# Patient Record
Sex: MALE | Race: WHITE | NOT HISPANIC OR LATINO
[De-identification: names, ages, dates, MRNs, and addresses within clinical notes are randomized per-mention and may not be internally consistent; named-entity substitution may affect disease eponyms.]

---

## 2017-01-05 ENCOUNTER — APPOINTMENT (OUTPATIENT)
Dept: INTERNAL MEDICINE | Facility: CLINIC | Age: 52
End: 2017-01-05

## 2017-01-05 VITALS — WEIGHT: 182 LBS | HEIGHT: 69 IN | BODY MASS INDEX: 26.96 KG/M2

## 2017-01-05 VITALS
DIASTOLIC BLOOD PRESSURE: 68 MMHG | TEMPERATURE: 99.7 F | SYSTOLIC BLOOD PRESSURE: 122 MMHG | RESPIRATION RATE: 14 BRPM | HEART RATE: 66 BPM

## 2017-01-05 DIAGNOSIS — J32.9 CHRONIC SINUSITIS, UNSPECIFIED: ICD-10-CM

## 2017-04-26 ENCOUNTER — RX RENEWAL (OUTPATIENT)
Age: 52
End: 2017-04-26

## 2018-01-22 ENCOUNTER — OUTPATIENT (OUTPATIENT)
Dept: OUTPATIENT SERVICES | Facility: HOSPITAL | Age: 53
LOS: 1 days | End: 2018-01-22
Payer: COMMERCIAL

## 2018-01-22 ENCOUNTER — APPOINTMENT (OUTPATIENT)
Dept: ULTRASOUND IMAGING | Facility: HOSPITAL | Age: 53
End: 2018-01-22
Payer: COMMERCIAL

## 2018-01-22 DIAGNOSIS — N20.0 CALCULUS OF KIDNEY: ICD-10-CM

## 2018-01-22 DIAGNOSIS — Z00.8 ENCOUNTER FOR OTHER GENERAL EXAMINATION: ICD-10-CM

## 2018-01-22 PROCEDURE — 76775 US EXAM ABDO BACK WALL LIM: CPT | Mod: 26

## 2018-01-22 PROCEDURE — 76775 US EXAM ABDO BACK WALL LIM: CPT

## 2018-06-14 ENCOUNTER — RX RENEWAL (OUTPATIENT)
Age: 53
End: 2018-06-14

## 2018-08-13 ENCOUNTER — APPOINTMENT (OUTPATIENT)
Dept: INTERNAL MEDICINE | Facility: CLINIC | Age: 53
End: 2018-08-13
Payer: COMMERCIAL

## 2018-08-13 ENCOUNTER — NON-APPOINTMENT (OUTPATIENT)
Age: 53
End: 2018-08-13

## 2018-08-13 VITALS
HEART RATE: 56 BPM | SYSTOLIC BLOOD PRESSURE: 128 MMHG | HEIGHT: 69 IN | WEIGHT: 176 LBS | RESPIRATION RATE: 14 BRPM | DIASTOLIC BLOOD PRESSURE: 74 MMHG | BODY MASS INDEX: 26.07 KG/M2

## 2018-08-13 PROCEDURE — 93000 ELECTROCARDIOGRAM COMPLETE: CPT

## 2018-08-13 PROCEDURE — 99245 OFF/OP CONSLTJ NEW/EST HI 55: CPT | Mod: 25

## 2018-08-13 RX ORDER — FLUTICASONE PROPIONATE 50 UG/1
50 SPRAY, METERED NASAL
Qty: 16 | Refills: 0 | Status: DISCONTINUED | COMMUNITY
Start: 2017-01-02 | End: 2018-08-13

## 2018-08-13 RX ORDER — LEVOFLOXACIN 500 MG/1
500 TABLET, FILM COATED ORAL DAILY
Qty: 10 | Refills: 0 | Status: DISCONTINUED | COMMUNITY
Start: 2017-01-05 | End: 2018-08-13

## 2018-08-13 RX ORDER — BRINZOLAMIDE/BRIMONIDINE TARTRATE 10; 2 MG/ML; MG/ML
1-0.2 SUSPENSION/ DROPS OPHTHALMIC
Qty: 8 | Refills: 0 | Status: DISCONTINUED | COMMUNITY
Start: 2016-07-13 | End: 2018-08-13

## 2018-08-13 NOTE — PHYSICAL EXAM
[No Acute Distress] : no acute distress [Well Nourished] : well nourished [Well Developed] : well developed [Well-Appearing] : well-appearing [Normal Sclera/Conjunctiva] : normal sclera/conjunctiva [PERRL] : pupils equal round and reactive to light [EOMI] : extraocular movements intact [Normal Outer Ear/Nose] : the outer ears and nose were normal in appearance [Normal Oropharynx] : the oropharynx was normal [Normal TMs] : both tympanic membranes were normal [Normal Nasal Mucosa] : the nasal mucosa was normal [No JVD] : no jugular venous distention [Supple] : supple [No Lymphadenopathy] : no lymphadenopathy [Thyroid Normal, No Nodules] : the thyroid was normal and there were no nodules present [No Respiratory Distress] : no respiratory distress  [Clear to Auscultation] : lungs were clear to auscultation bilaterally [No Accessory Muscle Use] : no accessory muscle use [Normal Rate] : normal rate  [Regular Rhythm] : with a regular rhythm [Normal S1, S2] : normal S1 and S2 [No Murmur] : no murmur heard [No Carotid Bruits] : no carotid bruits [No Abdominal Bruit] : a ~M bruit was not heard ~T in the abdomen [No Varicosities] : no varicosities [Pedal Pulses Present] : the pedal pulses are present [No Edema] : there was no peripheral edema [No Extremity Clubbing/Cyanosis] : no extremity clubbing/cyanosis [No Palpable Aorta] : no palpable aorta [Declined Breast Exam] : declined breast exam  [Soft] : abdomen soft [Non Tender] : non-tender [Non-distended] : non-distended [No Masses] : no abdominal mass palpated [No HSM] : no HSM [Normal Bowel Sounds] : normal bowel sounds [No Hernias] : no hernias [Declined Rectal Exam] : declined rectal exam [Normal Supraclavicular Nodes] : no supraclavicular lymphadenopathy [Normal Axillary Nodes] : no axillary lymphadenopathy [Normal Posterior Cervical Nodes] : no posterior cervical lymphadenopathy [Normal Femoral Nodes] : no femoral lymphadenopathy [Normal Anterior Cervical Nodes] : no anterior cervical lymphadenopathy [Normal Inguinal Nodes] : no inguinal lymphadenopathy [No CVA Tenderness] : no CVA  tenderness [No Spinal Tenderness] : no spinal tenderness [No Joint Swelling] : no joint swelling [Grossly Normal Strength/Tone] : grossly normal strength/tone [No Rash] : no rash [No Skin Lesions] : no skin lesions [Normal Gait] : normal gait [Coordination Grossly Intact] : coordination grossly intact [No Focal Deficits] : no focal deficits [Deep Tendon Reflexes (DTR)] : deep tendon reflexes were 2+ and symmetric [Normal Affect] : the affect was normal [Normal Insight/Judgement] : insight and judgment were intact [Kyphosis] : no kyphosis [Scoliosis] : no scoliosis

## 2018-08-13 NOTE — ASSESSMENT
[Patient Optimized for Surgery] : Patient optimized for surgery [No Further Testing Recommended] : no further testing recommended [FreeTextEntry4] : Examination shows a well-developed man in no acute distress blood pressure was 120/74 pulse was 56 and regular respiratory rate was 14 HEENT was unremarkable chest was clear cardiovascular was regular with no extra heart sounds or murmurs abdomen was soft and nontender extremities showed no clubbing cyanosis or edema neurologic exam was nonfocal preoperative EKG showed sinus bradycardia otherwise normal with no acute ST-T wave changes preadmission CBC was normal patient's basic metabolic profile showed normal electrolytes and a creatinine of 1.35 ....patient is cleared medically for planned back surgery

## 2018-08-13 NOTE — RESULTS/DATA
[] : results reviewed [de-identified] : nl [de-identified] : cr 1.35 [de-identified] : sb otherwise normal
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
## 2018-08-14 LAB
ANION GAP SERPL CALC-SCNC: 16 MMOL/L
BASOPHILS # BLD AUTO: 0.05 K/UL
BASOPHILS NFR BLD AUTO: 0.7 %
BUN SERPL-MCNC: 18 MG/DL
CALCIUM SERPL-MCNC: 9.4 MG/DL
CHLORIDE SERPL-SCNC: 104 MMOL/L
CO2 SERPL-SCNC: 21 MMOL/L
CREAT SERPL-MCNC: 1.35 MG/DL
EOSINOPHIL # BLD AUTO: 0.13 K/UL
EOSINOPHIL NFR BLD AUTO: 1.7 %
GLUCOSE SERPL-MCNC: 83 MG/DL
HCT VFR BLD CALC: 47.9 %
HGB BLD-MCNC: 16.5 G/DL
IMM GRANULOCYTES NFR BLD AUTO: 0.1 %
LYMPHOCYTES # BLD AUTO: 2.65 K/UL
LYMPHOCYTES NFR BLD AUTO: 35.6 %
MAN DIFF?: NORMAL
MCHC RBC-ENTMCNC: 32.2 PG
MCHC RBC-ENTMCNC: 34.4 GM/DL
MCV RBC AUTO: 93.4 FL
MONOCYTES # BLD AUTO: 0.6 K/UL
MONOCYTES NFR BLD AUTO: 8.1 %
NEUTROPHILS # BLD AUTO: 4.01 K/UL
NEUTROPHILS NFR BLD AUTO: 53.8 %
PLATELET # BLD AUTO: 245 K/UL
POTASSIUM SERPL-SCNC: 4.6 MMOL/L
RBC # BLD: 5.13 M/UL
RBC # FLD: 12.8 %
SODIUM SERPL-SCNC: 141 MMOL/L
WBC # FLD AUTO: 7.45 K/UL

## 2018-11-16 ENCOUNTER — APPOINTMENT (OUTPATIENT)
Dept: INTERNAL MEDICINE | Facility: CLINIC | Age: 53
End: 2018-11-16
Payer: COMMERCIAL

## 2018-11-16 VITALS
WEIGHT: 178 LBS | DIASTOLIC BLOOD PRESSURE: 76 MMHG | HEART RATE: 60 BPM | RESPIRATION RATE: 15 BRPM | HEIGHT: 69 IN | BODY MASS INDEX: 26.36 KG/M2 | SYSTOLIC BLOOD PRESSURE: 126 MMHG

## 2018-11-16 DIAGNOSIS — Z23 ENCOUNTER FOR IMMUNIZATION: ICD-10-CM

## 2018-11-16 PROCEDURE — 99215 OFFICE O/P EST HI 40 MIN: CPT | Mod: 25

## 2018-11-16 PROCEDURE — 90686 IIV4 VACC NO PRSV 0.5 ML IM: CPT

## 2018-11-16 PROCEDURE — G0008: CPT

## 2018-11-16 NOTE — HISTORY OF PRESENT ILLNESS
[FreeTextEntry1] : Comes to the office for followup view medication she can discuss his overall health he is recovering from her recent back operation [de-identified] : 53-year-old man history of back surgery allergies migraines comes to the office for followup and to receive the influenza vaccine is also needs a slip for complete blood tests his back has been doing better he continues with physical therapy he denies temperature chills sweats or myalgias he's had no headache sinus congestion sore throat cough wheezing pleurisy chest pain shortness of breath exertional dyspnea lightheadedness palpitations dizziness vertigo or syncope denies abdominal pain nausea vomiting diarrhea or constipation bright red blood per rectum or black stools his appetite has been good his weight is stable he works on his feet and does not do any formal exercise he said no dysuria said minimal nocturia no problems with erections\par \par

## 2018-11-16 NOTE — REVIEW OF SYSTEMS
[Nocturia] : nocturia [Back Pain] : back pain [Negative] : Heme/Lymph [Fever] : no fever [Chills] : no chills [Fatigue] : no fatigue [Hot Flashes] : no hot flashes [Night Sweats] : no night sweats [Recent Change In Weight] : ~T no recent weight change [Discharge] : no discharge [Pain] : no pain [Redness] : no redness [Dryness] : no dryness [Vision Problems] : no vision problems [Itching] : no itching [Earache] : no earache [Hearing Loss] : no hearing loss [Nosebleeds] : no nosebleeds [Postnasal Drip] : no postnasal drip [Nasal Discharge] : no nasal discharge [Sore Throat] : no sore throat [Hoarseness] : no hoarseness [Chest Pain] : no chest pain [Palpitations] : no palpitations [Claudication] : no  leg claudication [Lower Ext Edema] : no lower extremity edema [Orthopena] : no orthopnea [Paroysmal Nocturnal Dyspnea] : no paroysmal nocturnal dyspnea [Shortness Of Breath] : no shortness of breath [Wheezing] : no wheezing [Cough] : no cough [Dyspnea on Exertion] : not dyspnea on exertion [Abdominal Pain] : no abdominal pain [Nausea] : no nausea [Constipation] : no constipation [Diarrhea] : no diarrhea [Vomiting] : no vomiting [Heartburn] : no heartburn [Melena] : no melena [Dysuria] : no dysuria [Incontinence] : no incontinence [Hesitancy] : no hesitancy [Hematuria] : no hematuria [Frequency] : no frequency [Impotence] : no impotency [Poor Libido] : libido not poor [Joint Pain] : no joint pain [Joint Stiffness] : no joint stiffness [Muscle Pain] : no muscle pain [Muscle Weakness] : no muscle weakness [Joint Swelling] : no joint swelling [Itching] : no itching [Mole Changes] : no mole changes [Nail Changes] : no nail changes [Hair Changes] : no hair changes [Skin Rash] : no skin rash [Headache] : no headache [Dizziness] : no dizziness [Fainting] : no fainting [Confusion] : no confusion [Unsteady Walk] : no ataxia [Memory Loss] : no memory loss [Suicidal] : not suicidal [Insomnia] : no insomnia [Anxiety] : no anxiety [Depression] : no depression [Easy Bleeding] : no easy bleeding [Easy Bruising] : no easy bruising [Swollen Glands] : no swollen glands

## 2018-11-16 NOTE — HEALTH RISK ASSESSMENT
[No falls in past year] : Patient reported no falls in the past year [0] : 2) Feeling down, depressed, or hopeless: Not at all (0) [[id number]] : 0

## 2018-11-16 NOTE — ASSESSMENT
[FreeTextEntry1] : Physical examination reveals a well-developed man in no acute distress blood pressure was 126/76 height 5 foot 9 weight 178 pounds pulse is 60 respirations 15 HEENT was unremarkable chest was clear cardiovascular exam was regular abdomen was soft and nontender extremities showed no clubbing cyanosis or edema neurologic exam was nonfocal a slip was given for complete blood testing including hepatitis C antibody PSA CBC full chemistries lipid profile hemoglobin A1c thyroid profile and vitamins D3 and B12 he is up-to-date with his ophthalmologist he was given the name of a dermatologist to go to his colonoscopy was performed 2 years ago we will try to exercise more to get some cardiovascular exercise

## 2019-12-11 ENCOUNTER — EMERGENCY (EMERGENCY)
Facility: HOSPITAL | Age: 54
LOS: 1 days | Discharge: HOME | End: 2019-12-11
Attending: EMERGENCY MEDICINE | Admitting: EMERGENCY MEDICINE
Payer: COMMERCIAL

## 2019-12-11 VITALS
HEART RATE: 76 BPM | SYSTOLIC BLOOD PRESSURE: 117 MMHG | TEMPERATURE: 98 F | OXYGEN SATURATION: 98 % | WEIGHT: 179.9 LBS | HEIGHT: 69 IN | DIASTOLIC BLOOD PRESSURE: 103 MMHG | RESPIRATION RATE: 18 BRPM

## 2019-12-11 VITALS
SYSTOLIC BLOOD PRESSURE: 122 MMHG | DIASTOLIC BLOOD PRESSURE: 98 MMHG | OXYGEN SATURATION: 99 % | RESPIRATION RATE: 18 BRPM | HEART RATE: 72 BPM

## 2019-12-11 DIAGNOSIS — Z98.890 OTHER SPECIFIED POSTPROCEDURAL STATES: Chronic | ICD-10-CM

## 2019-12-11 DIAGNOSIS — M54.2 CERVICALGIA: ICD-10-CM

## 2019-12-11 PROCEDURE — 96372 THER/PROPH/DIAG INJ SC/IM: CPT

## 2019-12-11 PROCEDURE — 99284 EMERGENCY DEPT VISIT MOD MDM: CPT | Mod: 25

## 2019-12-11 PROCEDURE — 99283 EMERGENCY DEPT VISIT LOW MDM: CPT

## 2019-12-11 RX ORDER — IBUPROFEN 200 MG
1 TABLET ORAL
Qty: 15 | Refills: 0
Start: 2019-12-11

## 2019-12-11 RX ORDER — LIDOCAINE 4 G/100G
1 CREAM TOPICAL ONCE
Refills: 0 | Status: COMPLETED | OUTPATIENT
Start: 2019-12-11 | End: 2019-12-11

## 2019-12-11 RX ORDER — DIAZEPAM 5 MG
1 TABLET ORAL
Qty: 10 | Refills: 0
Start: 2019-12-11

## 2019-12-11 RX ORDER — DIAZEPAM 5 MG
5 TABLET ORAL ONCE
Refills: 0 | Status: DISCONTINUED | OUTPATIENT
Start: 2019-12-11 | End: 2019-12-11

## 2019-12-11 RX ORDER — KETOROLAC TROMETHAMINE 30 MG/ML
30 SYRINGE (ML) INJECTION ONCE
Refills: 0 | Status: DISCONTINUED | OUTPATIENT
Start: 2019-12-11 | End: 2019-12-11

## 2019-12-11 RX ORDER — ATORVASTATIN CALCIUM 80 MG/1
1 TABLET, FILM COATED ORAL
Qty: 0 | Refills: 0 | DISCHARGE

## 2019-12-11 RX ADMIN — Medication 5 MILLIGRAM(S): at 12:45

## 2019-12-11 RX ADMIN — LIDOCAINE 1 PATCH: 4 CREAM TOPICAL at 12:45

## 2019-12-11 RX ADMIN — Medication 30 MILLIGRAM(S): at 12:45

## 2019-12-11 NOTE — ED ADULT NURSE NOTE - CHIEF COMPLAINT
Received appeal response and placed in your in basket. Appeal was denied as it is a plan exclusion. Any product dispensed for the purpose of appetite suppression or weight loss. The patient is a 54y Male complaining of neck pain.

## 2019-12-11 NOTE — ED PROVIDER NOTE - NSFOLLOWUPINSTRUCTIONS_ED_ALL_ED_FT
1. Lidocaine patches for relief  2. Motrin 400mg every 6 hours on a full stomach as needed for pain  3. Valium 5mg every 8 hours as needed for pain. Do not drink alcohol or drive on this med  4. Follow up with Dr. Allen in 1-2 days  5. Return to the ED for worsening pain, inability to turn your neck or any concerns  ******    Spasmodic Torticollis    WHAT YOU NEED TO KNOW:    Spasmodic torticollis, also called cervical dystonia, is a condition that causes your neck muscles to contract abnormally. Your neck may twist and cause your head to tilt to one side, forward, or backward. Spasmodic torticollis may occur occasionally or continuously. It often gets worse with stress.    DISCHARGE INSTRUCTIONS:    Medicines: You may need any of the following:     Muscle relaxers decrease pain and muscle spasms.      Botulinum toxin injections may also be given to relax your muscles.      NSAIDs, such as ibuprofen, help decrease swelling, pain, and fever. This medicine is available with or without a doctor's order. NSAIDs can cause stomach bleeding or kidney problems in certain people. If you take blood thinner medicine, always ask if NSAIDs are safe for you. Always read the medicine label and follow directions. Do not give these medicines to children under 6 months of age without direction from your child's healthcare provider.      Acetaminophen decreases pain. It is available without a doctor's order. Ask how much to take and how often to take it. Follow directions. Acetaminophen can cause liver damage if not taken correctly.      Prescription pain medicine may be given. Ask your healthcare provider how to take this medicine safely.      Take your medicine as directed. Contact your healthcare provider if you think your medicine is not helping or if you have side effects. Tell him if you are allergic to any medicine. Keep a list of the medicines, vitamins, and herbs you take. Include the amounts, and when and why you take them. Bring the list or the pill bottles to follow-up visits. Carry your medicine list with you in case of an emergency.    Follow up with your healthcare provider as directed: Write down your questions so you remember to ask them during your visits.    Self-care:     Apply ice on your neck for 15 to 20 minutes every hour or as directed. Use an ice pack, or put crushed ice in a plastic bag. Cover it with a towel. Ice helps prevent tissue damage and decreases swelling and pain.      Apply heat on your neck for 20 to 30 minutes every 2 hours for as many days as directed. Heat helps decrease pain and muscle spasms.      Rest as needed. Return to your daily activities as directed.       Use cervical collar as directed. A cervical collar may be needed to support your neck.     Contact your healthcare provider if:     You have a fever.       You have swelling in your neck area that gets worse or does not go away.      You have an increased feeling of sadness or loneliness, or you feel depressed.      You have questions or concerns about your condition or care.    Return to the emergency department if:     You have increased pain in your neck or shoulder.      You have sudden shortness of breath.       You have trouble moving your arms or legs.      Your arms or legs feel numb.

## 2019-12-11 NOTE — ED ADULT TRIAGE NOTE - CHIEF COMPLAINT QUOTE
Pt BIB EMS ambulatory with c/o left upper neck spasm starting this AM. Pt took Cyclobenzaprine at 10 am with minimal relief. Pt denies any recent injury or trauma. Pt has hx of back surgery one year ago.

## 2019-12-11 NOTE — ED PROVIDER NOTE - PATIENT PORTAL LINK FT
You can access the FollowMyHealth Patient Portal offered by [facility] by registering at the following website: [email or web address]. By joining [facility]’s FollowMyHealth portal, you will also be able to view your health information using other applications (apps) compatible with our system.

## 2019-12-11 NOTE — ED PROVIDER NOTE - OBJECTIVE STATEMENT
54M h/o lower back surgery woke up with left neck pain worse with ROM. No weakness or numbness in arms or legs. No chest pain or sob. No blurry vision. took Flexeril with minimal relief.

## 2019-12-11 NOTE — ED ADULT NURSE NOTE - OBJECTIVE STATEMENT
Pt arrives to ER c/o left sided neck pain since this morning when he woke up. pt reports spasms, denies numbness or tingling, denies injury.

## 2019-12-11 NOTE — ED PROVIDER NOTE - CLINICAL SUMMARY MEDICAL DECISION MAKING FREE TEXT BOX
Pt p/w left neck pain on waking, no neuro sx, likely torticollis, pt improved with symptomatic treatment.

## 2019-12-17 ENCOUNTER — MEDICATION RENEWAL (OUTPATIENT)
Age: 54
End: 2019-12-17

## 2019-12-17 ENCOUNTER — RX RENEWAL (OUTPATIENT)
Age: 54
End: 2019-12-17

## 2020-01-09 ENCOUNTER — NON-APPOINTMENT (OUTPATIENT)
Age: 55
End: 2020-01-09

## 2020-01-09 ENCOUNTER — APPOINTMENT (OUTPATIENT)
Dept: INTERNAL MEDICINE | Facility: CLINIC | Age: 55
End: 2020-01-09
Payer: COMMERCIAL

## 2020-01-09 VITALS
HEART RATE: 55 BPM | TEMPERATURE: 97.9 F | SYSTOLIC BLOOD PRESSURE: 120 MMHG | WEIGHT: 185 LBS | RESPIRATION RATE: 15 BRPM | DIASTOLIC BLOOD PRESSURE: 78 MMHG | OXYGEN SATURATION: 98 % | HEIGHT: 69 IN | BODY MASS INDEX: 27.4 KG/M2

## 2020-01-09 DIAGNOSIS — R00.2 PALPITATIONS: ICD-10-CM

## 2020-01-09 DIAGNOSIS — H40.059 OCULAR HYPERTENSION, UNSPECIFIED EYE: ICD-10-CM

## 2020-01-09 DIAGNOSIS — Z98.890 OTHER SPECIFIED POSTPROCEDURAL STATES: ICD-10-CM

## 2020-01-09 DIAGNOSIS — G43.909 MIGRAINE, UNSPECIFIED, NOT INTRACTABLE, W/OUT STATUS MIGRAINOSUS: ICD-10-CM

## 2020-01-09 DIAGNOSIS — Z00.00 ENCOUNTER FOR GENERAL ADULT MEDICAL EXAMINATION W/OUT ABNORMAL FINDINGS: ICD-10-CM

## 2020-01-09 DIAGNOSIS — M54.9 DORSALGIA, UNSPECIFIED: ICD-10-CM

## 2020-01-09 PROCEDURE — 93000 ELECTROCARDIOGRAM COMPLETE: CPT

## 2020-01-09 PROCEDURE — 99396 PREV VISIT EST AGE 40-64: CPT | Mod: 25

## 2020-01-09 RX ORDER — BRINZOLAMIDE/BRIMONIDINE TARTRATE 10; 2 MG/ML; MG/ML
1-0.2 SUSPENSION/ DROPS OPHTHALMIC
Refills: 0 | Status: ACTIVE | COMMUNITY
Start: 2020-01-09

## 2020-01-09 NOTE — HISTORY OF PRESENT ILLNESS
[Spouse] : spouse [FreeTextEntry1] : Comes to the office for a comprehensive physical examination by his wife to review his medications and discuss his overall health patient also requesting a slip for complete blood tests [de-identified] : 54-year-old man with a history of hyperlipidemia migraines increased intraocular pressure status post laminectomy vertebral compression fracture and chronic pain syndrome comes to the office for a comprehensive physical examination he denies temperature chills sweats or myalgias he has had no  sinus congestion sore throat cough wheezing pleurisy chest pain he does get headaches several times a month relieved by Excedrin he denies shortness of breath exertional dyspnea lightheadedness dizziness vertigo or syncope he has had no normal pain nausea vomiting diarrhea heartburn bright red blood per rectum constipation or melena his appetite has been good his weight has been stable he does get up at night to urinate but denies dysuria or gross hematuria he denies erectile dysfunction he has had no leg edema or any recent skin rashes

## 2020-01-09 NOTE — HEALTH RISK ASSESSMENT
[HIV test declined] : HIV test declined [Hepatitis C test declined] : Hepatitis C test declined [ColonoscopyDate] : 12/16

## 2020-01-09 NOTE — PHYSICAL EXAM
[No Acute Distress] : no acute distress [Well Nourished] : well nourished [Well Developed] : well developed [Well-Appearing] : well-appearing [Normal Voice/Communication] : normal voice/communication [Normal Sclera/Conjunctiva] : normal sclera/conjunctiva [PERRL] : pupils equal round and reactive to light [Normal Outer Ear/Nose] : the outer ears and nose were normal in appearance [EOMI] : extraocular movements intact [Normal Oropharynx] : the oropharynx was normal [Normal TMs] : both tympanic membranes were normal [Normal Nasal Mucosa] : the nasal mucosa was normal [No Lymphadenopathy] : no lymphadenopathy [No JVD] : no jugular venous distention [Supple] : supple [Thyroid Normal, No Nodules] : the thyroid was normal and there were no nodules present [No Respiratory Distress] : no respiratory distress  [No Accessory Muscle Use] : no accessory muscle use [Normal Percussion] : the chest was normal to percussion [Clear to Auscultation] : lungs were clear to auscultation bilaterally [Regular Rhythm] : with a regular rhythm [Normal Rate] : normal rate  [Normal S1, S2] : normal S1 and S2 [No Murmur] : no murmur heard [No Carotid Bruits] : no carotid bruits [No Varicosities] : no varicosities [No Abdominal Bruit] : a ~M bruit was not heard ~T in the abdomen [Pedal Pulses Present] : the pedal pulses are present [No Extremity Clubbing/Cyanosis] : no extremity clubbing/cyanosis [No Edema] : there was no peripheral edema [No Palpable Aorta] : no palpable aorta [Soft] : abdomen soft [Declined Breast Exam] : declined breast exam  [Non Tender] : non-tender [No HSM] : no HSM [Non-distended] : non-distended [No Masses] : no abdominal mass palpated [No Hernias] : no hernias [Normal Bowel Sounds] : normal bowel sounds [Declined Rectal Exam] : declined rectal exam [Normal Posterior Cervical Nodes] : no posterior cervical lymphadenopathy [Normal Supraclavicular Nodes] : no supraclavicular lymphadenopathy [Normal Axillary Nodes] : no axillary lymphadenopathy [Normal Inguinal Nodes] : no inguinal lymphadenopathy [Normal Anterior Cervical Nodes] : no anterior cervical lymphadenopathy [Normal Femoral Nodes] : no femoral lymphadenopathy [No CVA Tenderness] : no CVA  tenderness [No Spinal Tenderness] : no spinal tenderness [No Joint Swelling] : no joint swelling [Grossly Normal Strength/Tone] : grossly normal strength/tone [No Skin Lesions] : no skin lesions [No Rash] : no rash [Coordination Grossly Intact] : coordination grossly intact [No Focal Deficits] : no focal deficits [Deep Tendon Reflexes (DTR)] : deep tendon reflexes were 2+ and symmetric [Normal Gait] : normal gait [Speech Grossly Normal] : speech grossly normal [Memory Grossly Normal] : memory grossly normal [Normal Affect] : the affect was normal [Normal Mood] : the mood was normal [Alert and Oriented x3] : oriented to person, place, and time [Normal Insight/Judgement] : insight and judgment were intact [Kyphosis] : no kyphosis [Scoliosis] : no scoliosis [Acne] : no acne

## 2020-01-09 NOTE — REVIEW OF SYSTEMS
[Nocturia] : nocturia [Back Pain] : back pain [Negative] : Heme/Lymph [Fever] : no fever [Chills] : no chills [Fatigue] : no fatigue [Hot Flashes] : no hot flashes [Night Sweats] : no night sweats [Recent Change In Weight] : ~T no recent weight change [Discharge] : no discharge [Redness] : no redness [Pain] : no pain [Vision Problems] : no vision problems [Dryness] : no dryness [Itching] : no itching [Earache] : no earache [Nosebleeds] : no nosebleeds [Hearing Loss] : no hearing loss [Postnasal Drip] : no postnasal drip [Hoarseness] : no hoarseness [Nasal Discharge] : no nasal discharge [Sore Throat] : no sore throat [Chest Pain] : no chest pain [Palpitations] : no palpitations [Claudication] : no  leg claudication [Lower Ext Edema] : no lower extremity edema [Orthopena] : no orthopnea [Paroxysmal Nocturnal Dyspnea] : no paroxysmal nocturnal dyspnea [Wheezing] : no wheezing [Cough] : no cough [Shortness Of Breath] : no shortness of breath [Abdominal Pain] : no abdominal pain [Dyspnea on Exertion] : not dyspnea on exertion [Nausea] : no nausea [Constipation] : no constipation [Heartburn] : no heartburn [Diarrhea] : no diarrhea [Vomiting] : no vomiting [Melena] : no melena [Dysuria] : no dysuria [Hesitancy] : no hesitancy [Incontinence] : no incontinence [Hematuria] : no hematuria [Impotence] : no impotency [Frequency] : no frequency [Joint Stiffness] : no joint stiffness [Joint Pain] : no joint pain [Poor Libido] : libido not poor [Muscle Weakness] : no muscle weakness [Muscle Pain] : no muscle pain [Joint Swelling] : no joint swelling [Itching] : no itching [Mole Changes] : no mole changes [Hair Changes] : no hair changes [Nail Changes] : no nail changes [Headache] : no headache [Skin Rash] : no skin rash [Dizziness] : no dizziness [Confusion] : no confusion [Unsteady Walk] : no ataxia [Fainting] : no fainting [Suicidal] : not suicidal [Insomnia] : no insomnia [Memory Loss] : no memory loss [Anxiety] : no anxiety [Easy Bleeding] : no easy bleeding [Depression] : no depression [Easy Bruising] : no easy bruising [Swollen Glands] : no swollen glands

## 2020-01-09 NOTE — ASSESSMENT
[FreeTextEntry1] : Physical exam shows a well-developed man in no acute distress blood pressure 120/78 height 5 feet 9 inches weight 185 pounds BMI 27.3 heart rate of 55 respirations 15 HEENT was unremarkable chest was clear her cardiovascular exam showed a normal S1 and S2 no extra heart sounds or murmurs abdomen was soft and nontender extremities showed no clubbing cyanosis or edema neurologic exam was nonfocal EKG showed sinus bradycardia with no acute ST-T wave changes/within normal limits patient is up-to-date with his ophthalmologist and dermatologist a colonoscopy was performed in December of 2016 making his next requirement for colonoscopy at the end of 2021 a slip was given for complete blood tests including CBC full chemistries lipid profile thyroid profile hemoglobin A1c urinalysis CRP PSA and vitamins D3 and B12 patient also will have blood tests for measles mumps and rubella antibodies patient is limited in the amount of exertion and exercise he can do because of his severe back discomfort but he has tried to walk a little bit to keep active

## 2020-01-11 ENCOUNTER — LABORATORY RESULT (OUTPATIENT)
Age: 55
End: 2020-01-11

## 2020-01-17 ENCOUNTER — APPOINTMENT (OUTPATIENT)
Dept: INTERNAL MEDICINE | Facility: CLINIC | Age: 55
End: 2020-01-17
Payer: COMMERCIAL

## 2020-01-17 VITALS
DIASTOLIC BLOOD PRESSURE: 78 MMHG | RESPIRATION RATE: 15 BRPM | SYSTOLIC BLOOD PRESSURE: 130 MMHG | TEMPERATURE: 98.2 F | BODY MASS INDEX: 27.4 KG/M2 | HEART RATE: 58 BPM | HEIGHT: 69 IN | WEIGHT: 185 LBS

## 2020-01-17 DIAGNOSIS — Z87.09 PERSONAL HISTORY OF OTHER DISEASES OF THE RESPIRATORY SYSTEM: ICD-10-CM

## 2020-01-17 DIAGNOSIS — E78.5 HYPERLIPIDEMIA, UNSPECIFIED: ICD-10-CM

## 2020-01-17 DIAGNOSIS — J30.2 OTHER SEASONAL ALLERGIC RHINITIS: ICD-10-CM

## 2020-01-17 PROBLEM — E78.00 PURE HYPERCHOLESTEROLEMIA, UNSPECIFIED: Chronic | Status: ACTIVE | Noted: 2019-12-11

## 2020-01-17 PROCEDURE — G0444 DEPRESSION SCREEN ANNUAL: CPT

## 2020-01-17 PROCEDURE — 99214 OFFICE O/P EST MOD 30 MIN: CPT | Mod: 25

## 2020-01-17 RX ORDER — AZITHROMYCIN 250 MG/1
250 TABLET, FILM COATED ORAL
Qty: 1 | Refills: 0 | Status: ACTIVE | COMMUNITY
Start: 2020-01-17 | End: 1900-01-01

## 2020-01-17 NOTE — PHYSICAL EXAM
[No Acute Distress] : no acute distress [Well Developed] : well developed [Well Nourished] : well nourished [Normal Voice/Communication] : normal voice/communication [Well-Appearing] : well-appearing [Normal Sclera/Conjunctiva] : normal sclera/conjunctiva [Normal Outer Ear/Nose] : the outer ears and nose were normal in appearance [PERRL] : pupils equal round and reactive to light [EOMI] : extraocular movements intact [Normal Oropharynx] : the oropharynx was normal [Normal Nasal Mucosa] : the nasal mucosa was normal [Normal TMs] : both tympanic membranes were normal [No Lymphadenopathy] : no lymphadenopathy [No JVD] : no jugular venous distention [Supple] : supple [No Respiratory Distress] : no respiratory distress  [Thyroid Normal, No Nodules] : the thyroid was normal and there were no nodules present [Normal Percussion] : the chest was normal to percussion [Clear to Auscultation] : lungs were clear to auscultation bilaterally [No Accessory Muscle Use] : no accessory muscle use [Regular Rhythm] : with a regular rhythm [Normal Rate] : normal rate  [Normal S1, S2] : normal S1 and S2 [No Murmur] : no murmur heard [No Carotid Bruits] : no carotid bruits [No Varicosities] : no varicosities [No Abdominal Bruit] : a ~M bruit was not heard ~T in the abdomen [Pedal Pulses Present] : the pedal pulses are present [No Edema] : there was no peripheral edema [No Palpable Aorta] : no palpable aorta [No Extremity Clubbing/Cyanosis] : no extremity clubbing/cyanosis [Soft] : abdomen soft [Declined Breast Exam] : declined breast exam  [Non Tender] : non-tender [Non-distended] : non-distended [No HSM] : no HSM [No Masses] : no abdominal mass palpated [Normal Bowel Sounds] : normal bowel sounds [No Hernias] : no hernias [Declined Rectal Exam] : declined rectal exam [Normal Axillary Nodes] : no axillary lymphadenopathy [Normal Posterior Cervical Nodes] : no posterior cervical lymphadenopathy [Normal Supraclavicular Nodes] : no supraclavicular lymphadenopathy [Normal Femoral Nodes] : no femoral lymphadenopathy [Normal Anterior Cervical Nodes] : no anterior cervical lymphadenopathy [Normal Inguinal Nodes] : no inguinal lymphadenopathy [Kyphosis] : no kyphosis [No CVA Tenderness] : no CVA  tenderness [No Spinal Tenderness] : no spinal tenderness [No Joint Swelling] : no joint swelling [Scoliosis] : no scoliosis [Grossly Normal Strength/Tone] : grossly normal strength/tone [No Rash] : no rash [No Skin Lesions] : no skin lesions [Acne] : no acne [Coordination Grossly Intact] : coordination grossly intact [No Focal Deficits] : no focal deficits [Deep Tendon Reflexes (DTR)] : deep tendon reflexes were 2+ and symmetric [Normal Gait] : normal gait [Speech Grossly Normal] : speech grossly normal [Normal Affect] : the affect was normal [Memory Grossly Normal] : memory grossly normal [Normal Insight/Judgement] : insight and judgment were intact [Normal Mood] : the mood was normal [Alert and Oriented x3] : oriented to person, place, and time

## 2020-01-17 NOTE — HEALTH RISK ASSESSMENT
[Yes] : Yes [No falls in past year] : Patient reported no falls in the past year [] : No [0] : 1) Little interest or pleasure doing things: Not at all (0) [[id number]] : 0

## 2020-01-17 NOTE — ASSESSMENT
[FreeTextEntry1] : Physical examination reveals a well-developed man in no acute distress his temperature was 98.2°F orally blood pressure 130/78 pulse of 58 respiratory rate of 15 HEENT sclerae mucous in the nostrils without pharyngitis or exudate there was no cervical adenopathy chest showed scattered rhonchi with no rales wheezing or dullness to percussion cardiovascular exam was regular abdomen was soft and nontender cavity showed no edema neurologic exam was nonfocal impression upper respiratory infection to treat with Flonase and Z-Sergey and Mucinex DM patient to call if his situation worsens in anyway recent blood tests were reviewed except for bilirubin of 1.3 were within normal limits

## 2020-01-17 NOTE — HISTORY OF PRESENT ILLNESS
[Cold Symptoms] : cold symptoms [___ Days ago] : [unfilled] days ago [Mild] : mild [Constant] : constant [Gradual] : gradually [Congestion] : congestion [Sore Throat] : no sore throat [Cough] : cough [Chills] : no chills [Anorexia] : no anorexia [Wheezing] : no wheezing [Shortness Of Breath] : no shortness of breath [Earache] : no earache [Fatigue] : not fatigue [Headache] : no headache [Fever] : no fever [Rest] : rest [OTC Remedies] : OTC remedies [Activity] : with activity [Worsening] : worsening [FreeTextEntry8] : 54-year-old man with a history of seasonal allergies and hyperlipidemia comes to the office acutely with a two-day history of progressive sinus congestion and cough patient denies temperature chills sweats or myalgias he denies headaches postnasal drip sore throat wheezing pleurisy chest pain shortness of breath exertional dyspnea lightheadedness palpitations dizziness vertigo or syncope the cough has been essentially nonproductive he has produced yellow mucus out of his nose he denies abdominal pain nausea vomiting diarrhea constipation bright red blood per rectum or black stools his appetite has been good his weight is been stable he denies significant musculoskeletal complaints he has had no leg edema or any recent skin rashes

## 2020-01-17 NOTE — REVIEW OF SYSTEMS
[Postnasal Drip] : postnasal drip [Nasal Discharge] : nasal discharge [Cough] : cough [Nocturia] : nocturia [Back Pain] : back pain [Negative] : Heme/Lymph [Fever] : no fever [Chills] : no chills [Fatigue] : no fatigue [Hot Flashes] : no hot flashes [Recent Change In Weight] : ~T no recent weight change [Night Sweats] : no night sweats [Discharge] : no discharge [Redness] : no redness [Pain] : no pain [Dryness] : no dryness [Itching] : no itching [Vision Problems] : no vision problems [Earache] : no earache [Hearing Loss] : no hearing loss [Nosebleeds] : no nosebleeds [Hoarseness] : no hoarseness [Sore Throat] : no sore throat [Claudication] : no  leg claudication [Chest Pain] : no chest pain [Palpitations] : no palpitations [Orthopena] : no orthopnea [Paroxysmal Nocturnal Dyspnea] : no paroxysmal nocturnal dyspnea [Lower Ext Edema] : no lower extremity edema [Dyspnea on Exertion] : not dyspnea on exertion [Shortness Of Breath] : no shortness of breath [Wheezing] : no wheezing [Abdominal Pain] : no abdominal pain [Constipation] : no constipation [Nausea] : no nausea [Diarrhea] : no diarrhea [Vomiting] : no vomiting [Heartburn] : no heartburn [Incontinence] : no incontinence [Melena] : no melena [Dysuria] : no dysuria [Frequency] : no frequency [Hesitancy] : no hesitancy [Hematuria] : no hematuria [Impotence] : no impotency [Poor Libido] : libido not poor [Joint Pain] : no joint pain [Muscle Pain] : no muscle pain [Muscle Weakness] : no muscle weakness [Joint Stiffness] : no joint stiffness [Mole Changes] : no mole changes [Joint Swelling] : no joint swelling [Nail Changes] : no nail changes [Hair Changes] : no hair changes [Skin Rash] : no skin rash [Dizziness] : no dizziness [Headache] : no headache [Confusion] : no confusion [Fainting] : no fainting [Unsteady Walk] : no ataxia [Memory Loss] : no memory loss [Suicidal] : not suicidal [Insomnia] : no insomnia [Anxiety] : no anxiety [Easy Bruising] : no easy bruising [Depression] : no depression [Easy Bleeding] : no easy bleeding [Swollen Glands] : no swollen glands

## 2020-02-03 LAB
25(OH)D3 SERPL-MCNC: 33.1 NG/ML
ALBUMIN SERPL ELPH-MCNC: 4.7 G/DL
ALP BLD-CCNC: 82 U/L
ALT SERPL-CCNC: 23 U/L
ANION GAP SERPL CALC-SCNC: 15 MMOL/L
APPEARANCE: CLEAR
AST SERPL-CCNC: 18 U/L
BASOPHILS # BLD AUTO: 0.09 K/UL
BASOPHILS NFR BLD AUTO: 1.3 %
BILIRUB SERPL-MCNC: 1.3 MG/DL
BILIRUBIN URINE: NEGATIVE
BLOOD URINE: ABNORMAL
BUN SERPL-MCNC: 19 MG/DL
CALCIUM SERPL-MCNC: 9.8 MG/DL
CHLORIDE SERPL-SCNC: 105 MMOL/L
CHOLEST SERPL-MCNC: 151 MG/DL
CHOLEST/HDLC SERPL: 3.4 RATIO
CO2 SERPL-SCNC: 23 MMOL/L
COLOR: YELLOW
CREAT SERPL-MCNC: 1.24 MG/DL
CRP SERPL HS-MCNC: 1.14 MG/L
EOSINOPHIL # BLD AUTO: 0.31 K/UL
EOSINOPHIL NFR BLD AUTO: 4.5 %
ESTIMATED AVERAGE GLUCOSE: 111 MG/DL
GLUCOSE BS SERPL-MCNC: 95 MG/DL
GLUCOSE QUALITATIVE U: NEGATIVE
GLUCOSE SERPL-MCNC: 97 MG/DL
HBA1C MFR BLD HPLC: 5.5 %
HCT VFR BLD CALC: 50 %
HDLC SERPL-MCNC: 44 MG/DL
HGB BLD-MCNC: 16.8 G/DL
IMM GRANULOCYTES NFR BLD AUTO: 0.3 %
KETONES URINE: NEGATIVE
LDLC SERPL CALC-MCNC: 82 MG/DL
LEUKOCYTE ESTERASE URINE: NEGATIVE
LYMPHOCYTES # BLD AUTO: 2.77 K/UL
LYMPHOCYTES NFR BLD AUTO: 40.3 %
MAN DIFF?: NORMAL
MCHC RBC-ENTMCNC: 31.7 PG
MCHC RBC-ENTMCNC: 33.6 GM/DL
MCV RBC AUTO: 94.3 FL
MEV IGG FLD QL IA: 100 AU/ML
MEV IGG+IGM SER-IMP: POSITIVE
MONOCYTES # BLD AUTO: 0.66 K/UL
MONOCYTES NFR BLD AUTO: 9.6 %
MUV AB SER-ACNC: POSITIVE
MUV IGG SER QL IA: 96.5 AU/ML
NEUTROPHILS # BLD AUTO: 3.02 K/UL
NEUTROPHILS NFR BLD AUTO: 44 %
NITRITE URINE: NEGATIVE
PH URINE: 5.5
PLATELET # BLD AUTO: 227 K/UL
POTASSIUM SERPL-SCNC: 4.8 MMOL/L
PROT SERPL-MCNC: 6.7 G/DL
PROTEIN URINE: NORMAL
PSA FREE FLD-MCNC: 65 %
PSA FREE SERPL-MCNC: 0.51 NG/ML
PSA SERPL-MCNC: 0.79 NG/ML
RBC # BLD: 5.3 M/UL
RBC # FLD: 12.5 %
RUBV IGG FLD-ACNC: 6.7 INDEX
RUBV IGG SER-IMP: POSITIVE
SODIUM SERPL-SCNC: 143 MMOL/L
SPECIFIC GRAVITY URINE: 1.03
T4 FREE SERPL-MCNC: 1.2 NG/DL
TRIGL SERPL-MCNC: 123 MG/DL
TSH SERPL-ACNC: 3.11 UIU/ML
UROBILINOGEN URINE: NORMAL
VIT B12 SERPL-MCNC: 420 PG/ML
WBC # FLD AUTO: 6.87 K/UL

## 2020-02-10 RX ORDER — FLUTICASONE PROPIONATE 50 UG/1
50 SPRAY, METERED NASAL DAILY
Qty: 1 | Refills: 0 | Status: ACTIVE | COMMUNITY
Start: 2020-01-17 | End: 1900-01-01

## 2020-06-04 RX ORDER — EPINEPHRINE 0.3 MG/.3ML
0.3 INJECTION INTRAMUSCULAR
Qty: 1 | Refills: 1 | Status: ACTIVE | COMMUNITY
Start: 2017-04-26 | End: 1900-01-01

## 2020-12-15 ENCOUNTER — RX RENEWAL (OUTPATIENT)
Age: 55
End: 2020-12-15

## 2020-12-23 PROBLEM — Z87.09 HISTORY OF UPPER RESPIRATORY INFECTION: Status: RESOLVED | Noted: 2020-01-17 | Resolved: 2020-12-23

## 2021-07-20 ENCOUNTER — RX RENEWAL (OUTPATIENT)
Age: 56
End: 2021-07-20

## 2021-07-20 RX ORDER — ATORVASTATIN CALCIUM 10 MG/1
10 TABLET, FILM COATED ORAL
Qty: 90 | Refills: 0 | Status: ACTIVE | COMMUNITY
Start: 2018-12-26 | End: 1900-01-01

## 2025-05-14 NOTE — REVIEW OF SYSTEMS
CTU [Nocturia] : nocturia [Back Pain] : back pain [Fever] : no fever [Chills] : no chills [Fatigue] : no fatigue [Hot Flashes] : no hot flashes [Night Sweats] : no night sweats [Recent Change In Weight] : ~T no recent weight change [Discharge] : no discharge [Pain] : no pain [Redness] : no redness [Dryness] : no dryness [Vision Problems] : no vision problems [Itching] : no itching [Earache] : no earache [Hearing Loss] : no hearing loss [Nosebleeds] : no nosebleeds [Postnasal Drip] : no postnasal drip [Nasal Discharge] : no nasal discharge [Sore Throat] : no sore throat [Hoarseness] : no hoarseness [Chest Pain] : no chest pain [Palpitations] : no palpitations [Claudication] : no  leg claudication [Lower Ext Edema] : no lower extremity edema [Orthopena] : no orthopnea [Paroysmal Nocturnal Dyspnea] : no paroysmal nocturnal dyspnea [Shortness Of Breath] : no shortness of breath [Wheezing] : no wheezing [Cough] : no cough [Dyspnea on Exertion] : not dyspnea on exertion [Abdominal Pain] : no abdominal pain [Nausea] : no nausea [Constipation] : no constipation [Diarrhea] : no diarrhea [Vomiting] : no vomiting [Heartburn] : no heartburn [Melena] : no melena [Dysuria] : no dysuria [Incontinence] : no incontinence [Hesitancy] : no hesitancy [Hematuria] : no hematuria [Frequency] : no frequency [Impotence] : no impotency [Poor Libido] : libido not poor [Joint Pain] : no joint pain [Joint Stiffness] : no joint stiffness [Muscle Pain] : no muscle pain [Muscle Weakness] : no muscle weakness [Joint Swelling] : no joint swelling [Itching] : no itching [Mole Changes] : no mole changes [Nail Changes] : no nail changes [Hair Changes] : no hair changes [Skin Rash] : no skin rash [Headache] : no headache [Dizziness] : no dizziness [Fainting] : no fainting [Confusion] : no confusion [Unsteady Walk] : no ataxia [Memory Loss] : no memory loss [Suicidal] : not suicidal [Insomnia] : no insomnia [Anxiety] : no anxiety [Depression] : no depression [Easy Bleeding] : no easy bleeding [Easy Bruising] : no easy bruising [Swollen Glands] : no swollen glands